# Patient Record
(demographics unavailable — no encounter records)

---

## 2024-10-22 NOTE — HISTORY OF PRESENT ILLNESS
[FreeTextEntry1] : She is a 38-year-old female who has a long history of early morning mid epigastric abdominal pain which is worse with eating.  She denies nausea, vomiting ,heartburn or chest pain.  She denies NSAID use.  She was placed on omeprazole 40 mg daily without any improvement in her symptoms.  She admits to increased stress in her life which seems to precipitate her symptoms.

## 2024-10-22 NOTE — CONSULT LETTER
[Dear  ___] : Dear  [unfilled], [Consult Letter:] : I had the pleasure of evaluating your patient, [unfilled]. [( Thank you for referring [unfilled] for consultation for _____ )] : Thank you for referring [unfilled] for consultation for [unfilled] [Please see my note below.] : Please see my note below. [Consult Closing:] : Thank you very much for allowing me to participate in the care of this patient.  If you have any questions, please do not hesitate to contact me. [Sincerely,] : Sincerely, [FreeTextEntry3] : Gary Bejarano MD  Gastroenterology [facility]

## 2024-11-11 NOTE — CONSULT LETTER
[Dear  ___] : Dear  [unfilled], [Consult Letter:] : I had the pleasure of evaluating your patient, [unfilled]. [( Thank you for referring [unfilled] for consultation for _____ )] : Thank you for referring [unfilled] for consultation for [unfilled] [Please see my note below.] : Please see my note below. [Consult Closing:] : Thank you very much for allowing me to participate in the care of this patient.  If you have any questions, please do not hesitate to contact me. [Sincerely,] : Sincerely, [FreeTextEntry3] : Gary Bejarano MD  Gastroenterology [facility]

## 2024-11-11 NOTE — ASSESSMENT
[FreeTextEntry1] : Continue dicyclomine Will start tapering her medication on her next visit.  Office follow up in 3 months    I spent 22 minutes with the patient and answered all of her questions

## 2024-11-11 NOTE — HISTORY OF PRESENT ILLNESS
[FreeTextEntry1] : Her recent endoscopy revealed normal mucosa throughout and a small hiatal hernia. She was placed on dicyclomine 20 mg 3x/ day. 1/2 hour before meals with marked reduction in her symptoms.  Routine biopsies were negative for Helicobacter pylori. She is feeling well and has no complaints.

## 2025-01-06 NOTE — HISTORY OF PRESENT ILLNESS
[FreeTextEntry1] : 11/2024: Her recent endoscopy revealed normal mucosa throughout and a small hiatal hernia. She was placed on dicyclomine 20 mg 3x/ day. 1/2 hour before meals with marked reduction in her symptoms. Routine biopsies were negative for Helicobacter pylori. She is feeling well and has no complaints.  RTO today 1/6/25 for epigastric abdominal pain and diarrhea. Patient states that epigastric pain, nausea/vomiting, abdominal cramping and diarrhea has significantly worsened since yesterday. Denies sob, cp, fevers or rectal bleeding. Discussed and reviewed last EGD performed 10/24/2024, resulted with small hiatal hernia and gastritis. Patient does not currently take any medications, previously took omeprazole and dicyclomine with no relief.   [de-identified] : 10/2024: Small hiatal hernia and gastritis.

## 2025-01-06 NOTE — ASSESSMENT
[FreeTextEntry1] : 11/2024: Her recent endoscopy revealed normal mucosa throughout and a small hiatal hernia. She was placed on dicyclomine 20 mg 3x/ day. 1/2 hour before meals with marked reduction in her symptoms. Routine biopsies were negative for Helicobacter pylori. She is feeling well and has no complaints.  RTO today 1/6/25 for epigastric abdominal pain and diarrhea. Patient states that epigastric pain, nausea/vomiting, abdominal cramping and diarrhea has significantly worsened since yesterday. Denies sob, cp, fevers or rectal bleeding. Discussed and reviewed last EGD performed 10/24/2024, resulted with small hiatal hernia and gastritis. Patient does not currently take any medications, previously took omeprazole and dicyclomine with no relief.   Plan: 1. Rx sent for pantoprazole 40 mg daily qAM and famotidine 40 mg daily qHS.  2. Ordered stool tests: GI PCR, ova/parasites. calprotectin, c.diff 3. Follow up 1-2 weeks.

## 2025-01-06 NOTE — REVIEW OF SYSTEMS
[Abdominal Pain] : abdominal pain [Vomiting] : vomiting [Diarrhea] : diarrhea [Heartburn] : heartburn [Bloating (gassiness)] : bloating

## 2025-01-23 NOTE — ASSESSMENT
[FreeTextEntry1] : 11/2024: Her recent endoscopy revealed normal mucosa throughout and a small hiatal hernia. She was placed on dicyclomine 20 mg 3x/ day. 1/2 hour before meals with marked reduction in her symptoms. Routine biopsies were negative for Helicobacter pylori. She is feeling well and has no complaints.  RTO 1/6/25 for epigastric abdominal pain and diarrhea. Patient states that epigastric pain, nausea/vomiting, abdominal cramping and diarrhea has significantly worsened since yesterday. Denies sob, cp, fevers or rectal bleeding. Discussed and reviewed last EGD performed 10/24/2024, resulted with small hiatal hernia and gastritis. Patient does not currently take any medications, previously took omeprazole and dicyclomine with no relief.  Chart note 1/8/25: Called patient to discuss positive stool test for rotavirus from 1/7/25. No response. left a voicemail. Awaiting call back. -Addendum: Discussed stool test results. Patient feeling better, stopped taking famotidine at bedtime. Will continue pantoprazole and follow up.  RTO today 1/23/25 for 2 week f/u. Patient feeling improved, abdominal pain and diarrhea completely resolved. Reports bloating and acid reflux after certain meals. Currently taking pantoprazole and famotidine. Patient tried to stop taking famotidine, but acid reflux returned during the night. Discussed long history of constipation. Recommended adding miralax to daily regimen, patient agreeable. Denies sob, cp, abdominal pain or rectal bleeding. Will continue pantoprazole and famotidine, follow up 2-3 months.   Plan: 1. Continue pantoprazole 40 mg daily qAM and famotidine 40 mg daily qHS 2. Will start miralax daily 3. Follow up 2-3 months.

## 2025-01-23 NOTE — HISTORY OF PRESENT ILLNESS
[FreeTextEntry1] : 11/2024: Her recent endoscopy revealed normal mucosa throughout and a small hiatal hernia. She was placed on dicyclomine 20 mg 3x/ day. 1/2 hour before meals with marked reduction in her symptoms. Routine biopsies were negative for Helicobacter pylori. She is feeling well and has no complaints.  RTO 1/6/25 for epigastric abdominal pain and diarrhea. Patient states that epigastric pain, nausea/vomiting, abdominal cramping and diarrhea has significantly worsened since yesterday. Denies sob, cp, fevers or rectal bleeding. Discussed and reviewed last EGD performed 10/24/2024, resulted with small hiatal hernia and gastritis. Patient does not currently take any medications, previously took omeprazole and dicyclomine with no relief.  Chart note 1/8/25: Called patient to discuss positive stool test for rotavirus from 1/7/25. No response. left a voicemail. Awaiting call back. -Addendum: Discussed stool test results. Patient feeling better, stopped taking famotidine at bedtime. Will continue pantoprazole and follow up.  RTO today 1/23/25 for 2 week f/u. Patient feeling improved, abdominal pain and diarrhea completely resolved. Reports bloating and acid reflux after certain meals. Currently taking pantoprazole and famotidine. Patient tried to stop taking famotidine, but acid reflux returned during the night. Discussed long history of constipation. Recommended adding miralax to daily regimen, patient agreeable. Denies sob, cp, abdominal pain or rectal bleeding. Will continue pantoprazole and famotidine, follow up 2-3 months.  [de-identified] :  10/2024: Small hiatal hernia and gastritis.

## 2025-02-28 NOTE — END OF VISIT
[Time Spent: ___ minutes] : I have spent [unfilled] minutes of time on the encounter which excludes teaching and separately reported services.
Present, unchanged

## 2025-04-03 NOTE — HISTORY OF PRESENT ILLNESS
[FreeTextEntry1] : 11/2024: Her recent endoscopy revealed normal mucosa throughout and a small hiatal hernia. She was placed on dicyclomine 20 mg 3x/ day. 1/2 hour before meals with marked reduction in her symptoms. Routine biopsies were negative for Helicobacter pylori. She is feeling well and has no complaints.  RTO 1/6/25 for epigastric abdominal pain and diarrhea. Patient states that epigastric pain, nausea/vomiting, abdominal cramping and diarrhea has significantly worsened since yesterday. Denies sob, cp, fevers or rectal bleeding. Discussed and reviewed last EGD performed 10/24/2024, resulted with small hiatal hernia and gastritis. Patient does not currently take any medications, previously took omeprazole and dicyclomine with no relief.  Chart note 1/8/25: Called patient to discuss positive stool test for rotavirus from 1/7/25. No response. left a voicemail. Awaiting call back. -Addendum: Discussed stool test results. Patient feeling better, stopped taking famotidine at bedtime. Will continue pantoprazole and follow up.  RTO 1/23/25 for 2 week f/u. Patient feeling improved, abdominal pain and diarrhea completely resolved. Reports bloating and acid reflux after certain meals. Currently taking pantoprazole and famotidine. Patient tried to stop taking famotidine, but acid reflux returned during the night. Discussed long history of constipation. Recommended adding miralax to daily regimen, patient agreeable. Denies sob, cp, abdominal pain or rectal bleeding. Will continue pantoprazole and famotidine, follow up 2-3 months.   RTO 4/3/25 for 3 month follow up. Patient feeling well. Currently taking pantoprazole and famotidine. Patient states that symptoms are improved but she still experiences fullness after consuming large meals. Patient planning to go on cruise next week. Denies sob, cp, abdominal pain or altered BMs. Recommend continuing pantoprazole 40 mg daily qAM and famotidine 40 mg daily qHS, will add sucralfate 1 gm BID. Repeat UBT. Follow up 4-6 months, or as needed.   [de-identified] :  10/2024: Small hiatal hernia and gastritis.

## 2025-04-03 NOTE — ASSESSMENT
[FreeTextEntry1] : 11/2024: Her recent endoscopy revealed normal mucosa throughout and a small hiatal hernia. She was placed on dicyclomine 20 mg 3x/ day. 1/2 hour before meals with marked reduction in her symptoms. Routine biopsies were negative for Helicobacter pylori. She is feeling well and has no complaints.  RTO 1/6/25 for epigastric abdominal pain and diarrhea. Patient states that epigastric pain, nausea/vomiting, abdominal cramping and diarrhea has significantly worsened since yesterday. Denies sob, cp, fevers or rectal bleeding. Discussed and reviewed last EGD performed 10/24/2024, resulted with small hiatal hernia and gastritis. Patient does not currently take any medications, previously took omeprazole and dicyclomine with no relief.  Chart note 1/8/25: Called patient to discuss positive stool test for rotavirus from 1/7/25. No response. left a voicemail. Awaiting call back. -Addendum: Discussed stool test results. Patient feeling better, stopped taking famotidine at bedtime. Will continue pantoprazole and follow up.  RTO 1/23/25 for 2 week f/u. Patient feeling improved, abdominal pain and diarrhea completely resolved. Reports bloating and acid reflux after certain meals. Currently taking pantoprazole and famotidine. Patient tried to stop taking famotidine, but acid reflux returned during the night. Discussed long history of constipation. Recommended adding miralax to daily regimen, patient agreeable. Denies sob, cp, abdominal pain or rectal bleeding. Will continue pantoprazole and famotidine, follow up 2-3 months.   RTO 4/3/25 for 3 month follow up. Patient feeling well. Currently taking pantoprazole and famotidine. Patient states that symptoms are improved but she still experiences fullness after consuming large meals. Patient planning to go on cruise next week. Denies sob, cp, abdominal pain or altered BMs. Recommend continuing pantoprazole 40 mg daily qAM and famotidine 40 mg daily qHS, will add sucralfate 1 gm BID. Repeat UBT. Follow up 4-6 months, or as needed.   Plan: 1. Continue pantoprazole and famotidine, add sucralfate 2. Ordered UBT 3. Follow up 4-6 months, or as needed.

## 2025-04-03 NOTE — PHYSICAL EXAM
[Alert] : alert [Normal Voice/Communication] : normal voice/communication [Healthy Appearing] : healthy appearing [No Acute Distress] : no acute distress [Sclera] : the sclera and conjunctiva were normal [Hearing Threshold Finger Rub Not [place]] : hearing was normal [Normal Lips/Gums] : the lips and gums were normal [Oropharynx] : the oropharynx was normal [Normal Appearance] : the appearance of the neck was normal [No Neck Mass] : no neck mass was observed [No Respiratory Distress] : no respiratory distress [No Acc Muscle Use] : no accessory muscle use [Respiration, Rhythm And Depth] : normal respiratory rhythm and effort [Auscultation Breath Sounds / Voice Sounds] : lungs were clear to auscultation bilaterally [Heart Rate And Rhythm] : heart rate was normal and rhythm regular [Normal S1, S2] : normal S1 and S2 [Murmurs] : no murmurs [Bowel Sounds] : normal bowel sounds [No Masses] : no abdominal mass palpated [Abdomen Tenderness] : non-tender [Abdomen Soft] : soft [] : no hepatosplenomegaly [Oriented To Time, Place, And Person] : oriented to person, place, and time